# Patient Record
Sex: MALE | Race: WHITE | NOT HISPANIC OR LATINO | Employment: OTHER | URBAN - METROPOLITAN AREA
[De-identification: names, ages, dates, MRNs, and addresses within clinical notes are randomized per-mention and may not be internally consistent; named-entity substitution may affect disease eponyms.]

---

## 2022-11-21 ENCOUNTER — OFFICE VISIT (OUTPATIENT)
Dept: INTERNAL MEDICINE | Facility: CLINIC | Age: 37
End: 2022-11-21

## 2022-11-21 VITALS
WEIGHT: 181 LBS | BODY MASS INDEX: 26.81 KG/M2 | TEMPERATURE: 99.1 F | OXYGEN SATURATION: 98 % | HEIGHT: 69 IN | DIASTOLIC BLOOD PRESSURE: 62 MMHG | SYSTOLIC BLOOD PRESSURE: 100 MMHG | HEART RATE: 91 BPM

## 2022-11-21 DIAGNOSIS — L30.9 DERMATITIS: ICD-10-CM

## 2022-11-21 DIAGNOSIS — Z00.00 HEALTHCARE MAINTENANCE: Primary | ICD-10-CM

## 2022-11-21 DIAGNOSIS — R61 HYPERHIDROSIS: ICD-10-CM

## 2022-11-21 PROBLEM — G43.909 MIGRAINE: Status: ACTIVE | Noted: 2019-09-17

## 2022-11-21 PROBLEM — M47.9 SPONDYLOSIS: Status: ACTIVE | Noted: 2022-11-21

## 2022-11-21 PROBLEM — J45.909 ASTHMA: Status: ACTIVE | Noted: 2019-09-17

## 2022-11-21 PROBLEM — R10.30 INGUINAL PAIN: Status: ACTIVE | Noted: 2022-11-21

## 2022-11-21 PROCEDURE — 99385 PREV VISIT NEW AGE 18-39: CPT | Performed by: NURSE PRACTITIONER

## 2022-11-21 PROCEDURE — 90471 IMMUNIZATION ADMIN: CPT | Performed by: NURSE PRACTITIONER

## 2022-11-21 PROCEDURE — 90686 IIV4 VACC NO PRSV 0.5 ML IM: CPT | Performed by: NURSE PRACTITIONER

## 2022-11-21 RX ORDER — GLYCOPYRROLATE 1 MG/1
1 TABLET ORAL 3 TIMES DAILY
Qty: 270 TABLET | Refills: 3 | Status: SHIPPED | OUTPATIENT
Start: 2022-11-21

## 2022-11-21 NOTE — PROGRESS NOTES
"Subjective   Valentin Cline is a 37 y.o. male and is here for a comprehensive physical exam. New patient here to establish care.  Health history and questionnaire have been reviewed in its entirety. The patient's previous primary care provider was Dr. Jerry Benítez.   The patient reports : C/o rash on the back of his hands for a couple of months. He started using Cetaphil lotion aboubt a week ago and this has helped some.     Do you take any herbs or supplements that were not prescribed by a doctor? no    Patient has hyperhidrosis of his hands and feet.  He takes glycopyrrolate 1 mg p.o. 3 times daily.  States that over this past year it does not seem to be working as well as it has in the past.  He has been on this for about 7 years    The following portions of the patient's history were reviewed and updated as appropriate: allergies, current medications, past family history, past medical history, past social history, past surgical history and problem list.    Review of Systems  Do you have pain that bothers you in your daily life? no  Pertinent items are noted in HPI.    Objective   /62   Pulse 91   Temp 99.1 °F (37.3 °C)   Ht 175.3 cm (69.02\")   Wt 82.1 kg (181 lb)   SpO2 98%   BMI 26.72 kg/m²     General Appearance:    Alert, cooperative, no distress, appears stated age   Head:    Normocephalic, without obvious abnormality, atraumatic   Eyes:    PERRL, conjunctiva/corneas clear, EOM's intact, both eyes   Ears:    Normal TM's and external ear canals, both ears   Nose:   Nares normal, septum midline, mucosa normal, no drainage    or sinus tenderness   Throat:   Lips, mucosa, and tongue normal; teeth and gums normal   Neck:   Supple, symmetrical, trachea midline, no adenopathy;     thyroid:  no enlargement/tenderness/nodules; no carotid    bruit   Back:     Symmetric, no curvature, ROM normal, no CVA tenderness   Lungs:     Clear to auscultation bilaterally, respirations unlabored   Chest Wall:    No " tenderness or deformity    Heart:    Regular rate and rhythm, S1 and S2 normal, no murmur       Abdomen:     Soft, non-tender, bowel sounds active all four quadrants,     no masses, no organomegaly           Extremities:   Extremities normal, atraumatic, no cyanosis or edema   Pulses:   2+ and symmetric all extremities   Skin:   Skin color, texture, turgor normal, erythematous dry patches on the dorsal side of his hands   Lymph nodes:   Cervical, supraclavicular, and axillary nodes normal   Neurologic:   Grossly intact, normal strength, sensation and reflexes     throughout        Assessment & Plan   Healthy male exam.      1. Diagnoses and all orders for this visit:    1. Healthcare maintenance (Primary)    2. Hyperhidrosis  -     glycopyrrolate (ROBINUL) 1 MG tablet; Take 1 tablet by mouth 3 (Three) Times a Day.  Dispense: 270 tablet; Refill: 3    3. Dermatitis    hyperhidrosis - the next step for treatment would be a referral to derm, but he would like to wait at this time.    Dermatitis - patient was given samples of CerVe lotion. Refer to derm if symptoms continue    2. Patient Counseling:  --Nutrition: Stressed importance of moderation in sodium/caffeine intake, saturated fat and cholesterol, caloric balance, sufficient intake of fresh fruits, vegetables, fiber, calcium, iron.  --Exercise: Stressed the importance of regular exercise.    --Dental health: Discussed importance of regular tooth brushing, flossing, and dental visits.               --Immunizations reviewed. Will received flu vaccine today    3. Follow up next physical in 1 year

## 2023-04-18 ENCOUNTER — OFFICE VISIT (OUTPATIENT)
Dept: INTERNAL MEDICINE | Facility: CLINIC | Age: 38
End: 2023-04-18
Payer: COMMERCIAL

## 2023-04-18 VITALS
WEIGHT: 181 LBS | TEMPERATURE: 97.6 F | BODY MASS INDEX: 26.72 KG/M2 | DIASTOLIC BLOOD PRESSURE: 78 MMHG | SYSTOLIC BLOOD PRESSURE: 118 MMHG | OXYGEN SATURATION: 99 % | HEART RATE: 75 BPM

## 2023-04-18 DIAGNOSIS — R10.32 LEFT LOWER QUADRANT ABDOMINAL PAIN: Primary | ICD-10-CM

## 2023-04-18 PROCEDURE — 99213 OFFICE O/P EST LOW 20 MIN: CPT | Performed by: NURSE PRACTITIONER

## 2023-04-18 RX ORDER — TRIAMCINOLONE ACETONIDE 5 MG/G
CREAM TOPICAL
COMMUNITY
Start: 2023-03-03

## 2023-04-18 NOTE — PROGRESS NOTES
"Inez Cline is a 37 y.o. male. Patient is here today for   Chief Complaint   Patient presents with   • Pelvic Pain   .    History of Present Illness   C/o left lower abdominal pain for about 2 weeks that has moved to left upper abdomen. \"dull, discomfort\"  He has had regular BMs. Denies fever, nausea. Patient states that he may have injured it when picking up his kids, but he doesn't remember any specific injury.       The following portions of the patient's history were reviewed and updated as appropriate: allergies, current medications, past family history, past medical history, past social history, past surgical history and problem list.    Review of Systems    Objective   Vitals:    04/18/23 1505   BP: 118/78   Pulse: 75   Temp: 97.6 °F (36.4 °C)   SpO2: 99%     Body mass index is 26.72 kg/m².  Physical Exam  Vitals and nursing note reviewed.   Constitutional:       Appearance: Normal appearance. He is well-developed.   Cardiovascular:      Rate and Rhythm: Normal rate and regular rhythm.      Heart sounds: Normal heart sounds.   Pulmonary:      Effort: Pulmonary effort is normal.      Breath sounds: Normal breath sounds.   Abdominal:      General: Bowel sounds are normal.      Tenderness: There is abdominal tenderness in the left upper quadrant and left lower quadrant.   Skin:     General: Skin is warm and dry.   Neurological:      Mental Status: He is alert and oriented to person, place, and time.   Psychiatric:         Speech: Speech normal.         Behavior: Behavior normal.         Thought Content: Thought content normal.         Assessment & Plan   Diagnoses and all orders for this visit:    1. Left lower quadrant abdominal pain (Primary)  -     CBC (No Diff)  -     Lipase  -     Comprehensive Metabolic Panel  -     CT Abdomen Pelvis With Contrast; Future      Will check the above labs and check a CT scan to evaluate pancreas.            "

## 2023-04-19 LAB
ALBUMIN SERPL-MCNC: 5.2 G/DL (ref 3.5–5.2)
ALBUMIN/GLOB SERPL: 2.4 G/DL
ALP SERPL-CCNC: 64 U/L (ref 39–117)
ALT SERPL-CCNC: 18 U/L (ref 1–41)
AST SERPL-CCNC: 16 U/L (ref 1–40)
BILIRUB SERPL-MCNC: 0.5 MG/DL (ref 0–1.2)
BUN SERPL-MCNC: 14 MG/DL (ref 6–20)
BUN/CREAT SERPL: 12.7 (ref 7–25)
CALCIUM SERPL-MCNC: 10.3 MG/DL (ref 8.6–10.5)
CHLORIDE SERPL-SCNC: 104 MMOL/L (ref 98–107)
CO2 SERPL-SCNC: 26.6 MMOL/L (ref 22–29)
CREAT SERPL-MCNC: 1.1 MG/DL (ref 0.76–1.27)
EGFRCR SERPLBLD CKD-EPI 2021: 89.2 ML/MIN/1.73
ERYTHROCYTE [DISTWIDTH] IN BLOOD BY AUTOMATED COUNT: 13.2 % (ref 12.3–15.4)
GLOBULIN SER CALC-MCNC: 2.2 GM/DL
GLUCOSE SERPL-MCNC: 90 MG/DL (ref 65–99)
HCT VFR BLD AUTO: 45.3 % (ref 37.5–51)
HGB BLD-MCNC: 15.2 G/DL (ref 13–17.7)
LIPASE SERPL-CCNC: 28 U/L (ref 13–60)
MCH RBC QN AUTO: 28 PG (ref 26.6–33)
MCHC RBC AUTO-ENTMCNC: 33.6 G/DL (ref 31.5–35.7)
MCV RBC AUTO: 83.6 FL (ref 79–97)
PLATELET # BLD AUTO: 235 10*3/MM3 (ref 140–450)
POTASSIUM SERPL-SCNC: 4.4 MMOL/L (ref 3.5–5.2)
PROT SERPL-MCNC: 7.4 G/DL (ref 6–8.5)
RBC # BLD AUTO: 5.42 10*6/MM3 (ref 4.14–5.8)
SODIUM SERPL-SCNC: 144 MMOL/L (ref 136–145)
WBC # BLD AUTO: 8.25 10*3/MM3 (ref 3.4–10.8)

## 2024-01-02 DIAGNOSIS — R61 HYPERHIDROSIS: ICD-10-CM

## 2024-01-02 RX ORDER — GLYCOPYRROLATE 1 MG/1
1 TABLET ORAL 3 TIMES DAILY
Qty: 270 TABLET | Refills: 0 | Status: SHIPPED | OUTPATIENT
Start: 2024-01-02

## 2024-02-22 DIAGNOSIS — Z00.00 HEALTHCARE MAINTENANCE: Primary | ICD-10-CM

## 2024-02-22 DIAGNOSIS — E06.9 THYROIDITIS: ICD-10-CM

## 2024-02-29 ENCOUNTER — TELEPHONE (OUTPATIENT)
Dept: INTERNAL MEDICINE | Facility: CLINIC | Age: 39
End: 2024-02-29
Payer: COMMERCIAL

## 2024-03-30 DIAGNOSIS — R61 HYPERHIDROSIS: ICD-10-CM

## 2024-04-01 RX ORDER — GLYCOPYRROLATE 1 MG/1
1 TABLET ORAL 3 TIMES DAILY
Qty: 270 TABLET | Refills: 0 | Status: SHIPPED | OUTPATIENT
Start: 2024-04-01

## 2024-04-02 ENCOUNTER — TELEPHONE (OUTPATIENT)
Dept: INTERNAL MEDICINE | Facility: CLINIC | Age: 39
End: 2024-04-02
Payer: COMMERCIAL

## 2024-06-30 DIAGNOSIS — R61 HYPERHIDROSIS: ICD-10-CM

## 2024-07-02 ENCOUNTER — TELEPHONE (OUTPATIENT)
Dept: INTERNAL MEDICINE | Facility: CLINIC | Age: 39
End: 2024-07-02
Payer: COMMERCIAL

## 2024-07-02 RX ORDER — GLYCOPYRROLATE 1 MG/1
1 TABLET ORAL 3 TIMES DAILY
Qty: 270 TABLET | Refills: 0 | Status: SHIPPED | OUTPATIENT
Start: 2024-07-02

## 2024-07-02 NOTE — TELEPHONE ENCOUNTER
----- Message from Xin FLORES sent at 7/2/2024  8:39 AM EDT -----  PLEASE SCHEDULE FOR PHYSICAL WITH FASTING LABS.

## 2024-10-07 DIAGNOSIS — R61 HYPERHIDROSIS: ICD-10-CM

## 2024-10-07 RX ORDER — GLYCOPYRROLATE 1 MG/1
1 TABLET ORAL 3 TIMES DAILY
Qty: 270 TABLET | Refills: 0 | OUTPATIENT
Start: 2024-10-07

## 2024-10-31 ENCOUNTER — OFFICE VISIT (OUTPATIENT)
Dept: INTERNAL MEDICINE | Facility: CLINIC | Age: 39
End: 2024-10-31
Payer: COMMERCIAL

## 2024-10-31 ENCOUNTER — TELEPHONE (OUTPATIENT)
Dept: INTERNAL MEDICINE | Facility: CLINIC | Age: 39
End: 2024-10-31

## 2024-10-31 VITALS
DIASTOLIC BLOOD PRESSURE: 80 MMHG | WEIGHT: 197 LBS | BODY MASS INDEX: 29.18 KG/M2 | HEART RATE: 79 BPM | OXYGEN SATURATION: 98 % | SYSTOLIC BLOOD PRESSURE: 118 MMHG | TEMPERATURE: 97.5 F | HEIGHT: 69 IN

## 2024-10-31 DIAGNOSIS — Z00.00 HEALTHCARE MAINTENANCE: Primary | ICD-10-CM

## 2024-10-31 DIAGNOSIS — M54.50 LUMBAR BACK PAIN: ICD-10-CM

## 2024-10-31 DIAGNOSIS — F90.2 ATTENTION DEFICIT HYPERACTIVITY DISORDER (ADHD), COMBINED TYPE: ICD-10-CM

## 2024-10-31 DIAGNOSIS — R61 HYPERHIDROSIS: ICD-10-CM

## 2024-10-31 RX ORDER — ATOMOXETINE 18 MG/1
18 CAPSULE ORAL DAILY
Qty: 30 CAPSULE | Refills: 1 | Status: SHIPPED | OUTPATIENT
Start: 2024-10-31

## 2024-10-31 RX ORDER — GLYCOPYRROLATE 1 MG/1
1 TABLET ORAL 3 TIMES DAILY
Qty: 270 TABLET | Refills: 3 | Status: SHIPPED | OUTPATIENT
Start: 2024-10-31

## 2024-10-31 NOTE — TELEPHONE ENCOUNTER
Caller: Valentin Cline A    Relationship to patient: Self    Best call back number:[phone]      Patient is needing: HE WAS SENT TO GET AN XRAY BUT THE COST WAS TOO HIGH SO HE DID NOT GO THROUGH WITH IT.  JUST WANTED TO LET YOU KNOW WHY HE DIDN'T GET IT.

## 2024-10-31 NOTE — PROGRESS NOTES
"Inez Cline is a 39 y.o. male and is here for a comprehensive physical exam. The patient reports  : History of scoliosis; had a spinal fusion about 20 years ago. He has had some back pain. He pulled a muscle about 4 times in the past year with the last time being about 3 weeks ago. Pain is in the left lower back. Coughing caused an episode of back pain. He has taken muscle relaxers, advil with minimal relief.     Do you take any herbs or supplements that were not prescribed by a doctor? no    Patient is requesting a refill on glycopyrrolate for hyperhidrosis. He doesn't take this daily but it does help when he takes it.     Patient is having trouble with focusing. It is starting to affect his daily life - losing glasses, etc. He is forgetful. He is not wanting to be on any stimulant medication. He has not been diagnosed with ADHD in the past. He was referred for neuropsych testing but it was going to be cost prohibitive to have the testing done.     The following portions of the patient's history were reviewed and updated as appropriate: allergies, current medications, past family history, past medical history, past social history, past surgical history and problem list.    Review of Systems  Do you have pain that bothers you in your daily life? yes  Pertinent items are noted in HPI.    Objective   /80 (BP Location: Left arm, Patient Position: Sitting, Cuff Size: Adult)   Pulse 79   Temp 97.5 °F (36.4 °C)   Ht 175.3 cm (69.02\")   Wt 89.4 kg (197 lb)   SpO2 98%   BMI 29.08 kg/m²     General Appearance:    Alert, cooperative, no distress, appears stated age   Head:    Normocephalic, without obvious abnormality, atraumatic   Eyes:    PERRL, conjunctiva/corneas clear, EOM's intact, both eyes   Ears:    Normal TM's and external ear canals, both ears   Nose:   Nares normal, septum midline, mucosa normal, no drainage    or sinus tenderness   Throat:   Lips, mucosa, and tongue normal; teeth and " gums normal   Neck:   Supple, symmetrical, trachea midline, no adenopathy;     thyroid:  no enlargement/tenderness/nodules; no carotid    bruit   Back:     Symmetric, no curvature, ROM  limited due to previous surgery; tenderness in left lumbar spine   Lungs:     Clear to auscultation bilaterally, respirations unlabored   Chest Wall:    No tenderness or deformity    Heart:    Regular rate and rhythm, S1 and S2 normal, no murmur       Abdomen:     Soft, non-tender, bowel sounds active all four quadrants,     no masses, no organomegaly           Extremities:   Extremities normal, atraumatic, no cyanosis or edema   Pulses:   2+ and symmetric all extremities   Skin:   Skin color, texture, turgor normal, no rashes or lesions   Lymph nodes:   Cervical, supraclavicular, and axillary nodes normal   Neurologic:   Grossly intact, normal strength, sensation and reflexes     throughout        Assessment & Plan   Healthy male exam.      1. Diagnoses and all orders for this visit:    1. Healthcare maintenance (Primary)    2. Lumbar back pain  -     XR Spine Lumbar 4+ View; Future    3. Attention deficit hyperactivity disorder (ADHD), combined type  -     atomoxetine (Strattera) 18 MG capsule; Take 1 capsule by mouth Daily.  Dispense: 30 capsule; Refill: 1    4. Hyperhidrosis  -     glycopyrrolate (ROBINUL) 1 MG tablet; Take 1 tablet by mouth 3 (Three) Times a Day.  Dispense: 270 tablet; Refill: 3    Lumbar back pain - If x-ray is okay, then will refer for physical therapy.     ADHD - will try strattera to see if this helps him with focus. Explained that it will take time to see any changes. F/u in one month    Hyperhidrosis - will refill glycopyrrolate    2. Patient Counseling:  --Nutrition: Stressed importance of moderation in sodium/caffeine intake, saturated fat and cholesterol, caloric balance, sufficient intake of fresh fruits, vegetables, fiber, calcium, iron.   --Exercise: Stressed the importance of regular exercise.  Walks; active with his young children  --Dental health: Discussed importance of regular tooth brushing, flossing, and dental visits.   --Immunizations reviewed. Declines flu vaccine (fever in the past)     3. Discussed the patient's BMI with him.  The BMI is above average; BMI management plan is completed  BMI is >= 25 and <30. (Overweight) The following options were offered after discussion;: exercise counseling/recommendations and nutrition counseling/recommendations       4. Follow up in 1 month to reassess ADHD symptoms

## 2024-12-05 ENCOUNTER — OFFICE VISIT (OUTPATIENT)
Dept: INTERNAL MEDICINE | Facility: CLINIC | Age: 39
End: 2024-12-05
Payer: COMMERCIAL

## 2024-12-05 VITALS
HEART RATE: 104 BPM | SYSTOLIC BLOOD PRESSURE: 108 MMHG | WEIGHT: 195 LBS | DIASTOLIC BLOOD PRESSURE: 80 MMHG | TEMPERATURE: 98.1 F | BODY MASS INDEX: 28.88 KG/M2 | OXYGEN SATURATION: 97 % | HEIGHT: 69 IN

## 2024-12-05 DIAGNOSIS — R05.1 ACUTE COUGH: ICD-10-CM

## 2024-12-05 DIAGNOSIS — F90.2 ATTENTION DEFICIT HYPERACTIVITY DISORDER (ADHD), COMBINED TYPE: Primary | ICD-10-CM

## 2024-12-05 PROCEDURE — 99214 OFFICE O/P EST MOD 30 MIN: CPT | Performed by: NURSE PRACTITIONER

## 2024-12-05 RX ORDER — BENZONATATE 200 MG/1
200 CAPSULE ORAL 3 TIMES DAILY PRN
Qty: 30 CAPSULE | Refills: 0 | Status: SHIPPED | OUTPATIENT
Start: 2024-12-05

## 2024-12-05 RX ORDER — ATOMOXETINE 18 MG/1
18 CAPSULE ORAL DAILY
Qty: 90 CAPSULE | Refills: 1 | Status: SHIPPED | OUTPATIENT
Start: 2024-12-05

## 2024-12-05 NOTE — PROGRESS NOTES
Inez Cline is a 39 y.o. male. Patient is here today for   Chief Complaint   Patient presents with    ADHD           Follow-up    Cough   .    History of Present Illness   C/o cough for 5 days. He has tried Cough drops, Delsym, Dayquil with minimal relief. Some shortness of breath and wheezing.  He went to the [facility] on Monday and tested negative for covid, flu.  He is feeling better overall, just continues with a cough    Patient is here to follow up on ADHD. He was more tired the first few days that he started Strattera, but feels better now.  States that Strattera has helped him to focus.    The following portions of the patient's history were reviewed and updated as appropriate: allergies, current medications, past family history, past medical history, past social history, past surgical history and problem list.    Review of Systems    Objective   Vitals:    12/05/24 1125   BP: 108/80   Pulse: 104   Temp: 98.1 °F (36.7 °C)   SpO2: 97%     Body mass index is 28.78 kg/m².  Physical Exam  Vitals and nursing note reviewed.   Constitutional:       Appearance: Normal appearance. He is well-developed.   HENT:      Right Ear: Tympanic membrane and ear canal normal.      Left Ear: Tympanic membrane and ear canal normal.      Mouth/Throat:      Pharynx: Oropharynx is clear.   Cardiovascular:      Rate and Rhythm: Normal rate and regular rhythm.      Heart sounds: Normal heart sounds.   Pulmonary:      Effort: Pulmonary effort is normal.      Breath sounds: Normal breath sounds.   Lymphadenopathy:      Cervical: No cervical adenopathy.   Skin:     General: Skin is warm and dry.   Neurological:      Mental Status: He is alert and oriented to person, place, and time.   Psychiatric:         Speech: Speech normal.         Behavior: Behavior normal.         Thought Content: Thought content normal.         Assessment & Plan   Diagnoses and all orders for this visit:    1. Attention deficit hyperactivity  disorder (ADHD), combined type (Primary)  -     atomoxetine (Strattera) 18 MG capsule; Take 1 capsule by mouth Daily.  Dispense: 90 capsule; Refill: 1    2. Acute cough  -     benzonatate (TESSALON) 200 MG capsule; Take 1 capsule by mouth 3 (Three) Times a Day As Needed for Cough.  Dispense: 30 capsule; Refill: 0    ADHD - Continue Strattera 18 mg daily. F/u in 6 months    Cough - take tessalon as needed. Increase fluids, rest. F/u if symptoms do not improve